# Patient Record
Sex: MALE | Race: WHITE | NOT HISPANIC OR LATINO | Employment: OTHER | URBAN - METROPOLITAN AREA
[De-identification: names, ages, dates, MRNs, and addresses within clinical notes are randomized per-mention and may not be internally consistent; named-entity substitution may affect disease eponyms.]

---

## 2020-01-17 ENCOUNTER — OFFICE VISIT (OUTPATIENT)
Dept: UROLOGY | Facility: CLINIC | Age: 45
End: 2020-01-17
Payer: COMMERCIAL

## 2020-01-17 VITALS
SYSTOLIC BLOOD PRESSURE: 128 MMHG | WEIGHT: 310 LBS | HEART RATE: 92 BPM | RESPIRATION RATE: 18 BRPM | HEIGHT: 72 IN | BODY MASS INDEX: 41.99 KG/M2 | DIASTOLIC BLOOD PRESSURE: 86 MMHG

## 2020-01-17 DIAGNOSIS — N20.0 STAGHORN CALCULUS: Primary | ICD-10-CM

## 2020-01-17 PROCEDURE — 99204 OFFICE O/P NEW MOD 45 MIN: CPT | Mod: S$GLB,,, | Performed by: SPECIALIST

## 2020-01-17 PROCEDURE — 99204 PR OFFICE/OUTPT VISIT, NEW, LEVL IV, 45-59 MIN: ICD-10-PCS | Mod: S$GLB,,, | Performed by: SPECIALIST

## 2020-01-17 RX ORDER — HYDROCODONE BITARTRATE AND ACETAMINOPHEN 10; 325 MG/1; MG/1
1 TABLET ORAL
COMMUNITY

## 2020-01-17 RX ORDER — VENLAFAXINE 100 MG/1
TABLET ORAL 2 TIMES DAILY
COMMUNITY

## 2020-01-17 RX ORDER — TRAZODONE HYDROCHLORIDE 50 MG/1
50 TABLET ORAL NIGHTLY
COMMUNITY

## 2020-01-17 RX ORDER — CYCLOBENZAPRINE HCL 10 MG
10 TABLET ORAL 3 TIMES DAILY PRN
COMMUNITY

## 2020-01-17 RX ORDER — OXYCODONE HCL 10 MG/1
10 TABLET, FILM COATED, EXTENDED RELEASE ORAL EVERY 12 HOURS PRN
COMMUNITY

## 2020-01-17 RX ORDER — AMOXICILLIN 500 MG
CAPSULE ORAL DAILY
COMMUNITY

## 2020-01-17 NOTE — PROGRESS NOTES
Subjective:       Patient ID: Donnell Avilez is a 44 y.o. male.    Chief Complaint: Nephrolithiasis (staghorn jaun. [facility])      HPI:  44-year-old man [facility] for staghorn calculus.  He reports a very prolonged history of nephrolithiasis been going on for years.  About 10-11 years ago he had treatment by a local urologist who performed retrograde ureteroscopic surgery for stones.    Since then he has been passing stones he reports that he has passed a couple over the years.  He has been dealing with persistent left flank pain is a dull ache that has a twisting component to it comes and goes.  On a scale 1-10 a worse is about a 6-7.  This pain is not radiating to any other parts of his body.  Mostly worsened by pressure.  [facility] was [facility] that confirmed he had a staghorn calculus.  He was referred to us for evaluation    Past Medical History:   Past Medical History:   Diagnosis Date    Diabetes mellitus     Herniated disc, cervical     Hyperlipidemia        Past Surgical Historical:   Past Surgical History:   Procedure Laterality Date    LITHOTRIPSY      UNDESCENDED TESTICLE EXPLORATION Left         Medications:   Medication List with Changes/Refills   Current Medications    CYCLOBENZAPRINE (FLEXERIL) 10 MG TABLET    Take 10 mg by mouth 3 (three) times daily as needed for Muscle spasms.    EMPAGLIFLOZIN (JARDIANCE) 25 MG TAB    Take 25 mg by mouth.    HYDROCODONE-ACETAMINOPHEN (NORCO)  MG PER TABLET    Take 1 tablet by mouth.    INSULIN GLARGINE,HUM.REC.ANLOG (LANTUS SUBQ)    Inject into the skin.    OMEGA-3 FATTY ACIDS/FISH OIL (FISH OIL-OMEGA-3 FATTY ACIDS) 300-1,000 MG CAPSULE    Take by mouth once daily.    OXYCODONE (OXYCONTIN) 10 MG 12 HR TABLET    Take 10 mg by mouth every 12 (twelve) hours as needed for Pain.    TRAZODONE (DESYREL) 50 MG TABLET    Take 50 mg by mouth every evening.    VENLAFAXINE  (EFFEXOR) 100 MG TAB    Take by mouth 2 (two) times daily.        Past Social History:   Social History     Socioeconomic History    Marital status:      Spouse name: Not on file    Number of children: Not on file    Years of education: Not on file    Highest education level: Not on file   Occupational History    Not on file   Social Needs    Financial resource strain: Not on file    Food insecurity:     Worry: Not on file     Inability: Not on file    Transportation needs:     Medical: Not on file     Non-medical: Not on file   Tobacco Use    Smoking status: Never Smoker    Smokeless tobacco: Never Used   Substance and Sexual Activity    Alcohol use: Never     Frequency: Never    Drug use: Never    Sexual activity: Not on file   Lifestyle    Physical activity:     Days per week: Not on file     Minutes per session: Not on file    Stress: Not on file   Relationships    Social connections:     Talks on phone: Not on file     Gets together: Not on file     Attends Hindu service: Not on file     Active member of club or organization: Not on file     Attends meetings of clubs or organizations: Not on file     Relationship status: Not on file   Other Topics Concern    Not on file   Social History Narrative    Not on file       Allergies: Review of patient's allergies indicates:  No Known Allergies     Family History:   Family History   Problem Relation Age of Onset    No Known Problems Father     Heart disease Mother         Review of Systems:  Review of Systems - General ROS: negative  Psychological ROS: negative  Ophthalmic ROS: negative  ENT ROS: negative  Allergy and Immunology ROS: negative  Hematological and Lymphatic ROS: negative  Endocrine ROS: negative  Respiratory ROS: no cough, shortness of breath, or wheezing  Cardiovascular ROS: no chest pain or dyspnea on exertion  Gastrointestinal ROS: no abdominal pain, change in bowel habits, or black or bloody stools  Genito-Urinary ROS:  positive for - left flank pain  Musculoskeletal ROS: negative  Neurological ROS: no TIA or stroke symptoms  Dermatological ROS: negative       Physical Exam:  General Appearance:    Alert, cooperative, no distress, appears stated age   Head:    Normocephalic, without obvious abnormality, atraumatic   Eyes:    PERRL, conjunctiva/corneas clear, EOM's intact, fundi     benign, both eyes        Ears:    Normal TM's and external ear canals, both ears   Nose:   Nares normal, septum midline, mucosa normal, no drainage    or sinus tenderness   Throat:   Lips, mucosa, and tongue normal; teeth and gums normal   Neck:   Supple, symmetrical, trachea midline, no adenopathy;        thyroid:  No enlargement/tenderness/nodules; no carotid    bruit or JVD   Back:     Symmetric, no curvature, ROM normal, no CVA tenderness   Lungs:     Clear to auscultation bilaterally, respirations unlabored   Chest wall:    No tenderness or deformity   Heart:    Regular rate and rhythm, S1 and S2 normal, no murmur, rub   or gallop   Abdomen:     Soft, non-tender, bowel sounds active all four quadrants,     no masses, no organomegaly   Genitalia:    Deferred   Rectal:    Deferred   Extremities:   Extremities normal, atraumatic, no cyanosis or edema   Pulses:   2+ and symmetric all extremities   Skin:   Skin color, texture, turgor normal, no rashes or lesions   Lymph nodes:   Cervical, supraclavicular, and axillary nodes normal   Neurologic:   CNII-XII intact. Normal strength, sensation and reflexes       throughout         Assessment/Plan:       44-year-old man with left flank pain found to have a staghorn calculus on KUB.    1.  Send him for CT scan abdomen and pelvis without IV contrast to characterize and determine the stone burden in the left kidney.  2.  Return to clinic in 2-3 weeks to make a plan about stone treatment    Problem List Items Addressed This Visit        Renal/    Staghorn calculus - Primary

## 2020-01-24 ENCOUNTER — OFFICE VISIT (OUTPATIENT)
Dept: UROLOGY | Facility: CLINIC | Age: 45
End: 2020-01-24
Payer: COMMERCIAL

## 2020-01-24 VITALS
WEIGHT: 310 LBS | RESPIRATION RATE: 19 BRPM | BODY MASS INDEX: 41.99 KG/M2 | HEART RATE: 98 BPM | HEIGHT: 72 IN | SYSTOLIC BLOOD PRESSURE: 139 MMHG | DIASTOLIC BLOOD PRESSURE: 88 MMHG

## 2020-01-24 DIAGNOSIS — N20.0 STAGHORN RENAL CALCULUS: Primary | ICD-10-CM

## 2020-01-24 PROCEDURE — 99213 OFFICE O/P EST LOW 20 MIN: CPT | Mod: S$GLB,,, | Performed by: SPECIALIST

## 2020-01-24 PROCEDURE — 99213 PR OFFICE/OUTPT VISIT, EST, LEVL III, 20-29 MIN: ICD-10-PCS | Mod: S$GLB,,, | Performed by: SPECIALIST

## 2020-01-24 NOTE — PROGRESS NOTES
Subjective:       Patient ID: Donnell Avilez is a 44 y.o. male.    Chief Complaint: Follow-up (2 wk f/u CT results)      HPI:  44-year-old  man with saw me about 2 weeks ago.  [facility] and [facility] KUB was obtained that showed a calcification overlying the left renal silhouette.  He was referred to me.  I set him up for a CT scan stone protocol here today to review the results.    We looked at the CT scan together he has almost a 3 cm stone in the left renal pelvis.  His predominant symptoms are pain in the left flank that prevents informed normal activities.  He just wants to stay be in bed all day.  He denies any blood in his urine denies any fevers or chills.    Past Medical History:   Past Medical History:   Diagnosis Date    Diabetes mellitus     Herniated disc, cervical     Hyperlipidemia        Past Surgical Historical:   Past Surgical History:   Procedure Laterality Date    LITHOTRIPSY      UNDESCENDED TESTICLE EXPLORATION Left         Medications:   Medication List with Changes/Refills   Current Medications    CYCLOBENZAPRINE (FLEXERIL) 10 MG TABLET    Take 10 mg by mouth 3 (three) times daily as needed for Muscle spasms.    EMPAGLIFLOZIN (JARDIANCE) 25 MG TAB    Take 25 mg by mouth.    HYDROCODONE-ACETAMINOPHEN (NORCO)  MG PER TABLET    Take 1 tablet by mouth.    INSULIN GLARGINE,HUM.REC.ANLOG (LANTUS SUBQ)    Inject into the skin.    OMEGA-3 FATTY ACIDS/FISH OIL (FISH OIL-OMEGA-3 FATTY ACIDS) 300-1,000 MG CAPSULE    Take by mouth once daily.    OXYCODONE (OXYCONTIN) 10 MG 12 HR TABLET    Take 10 mg by mouth every 12 (twelve) hours as needed for Pain.    TRAZODONE (DESYREL) 50 MG TABLET    Take 50 mg by mouth every evening.    VENLAFAXINE (EFFEXOR) 100 MG TAB    Take by mouth 2 (two) times daily.        Past Social History:   Social History     Socioeconomic History    Marital status:      Spouse name: Not on file    Number of children: Not on file    Years  of education: Not on file    Highest education level: Not on file   Occupational History    Not on file   Social Needs    Financial resource strain: Not on file    Food insecurity:     Worry: Not on file     Inability: Not on file    Transportation needs:     Medical: Not on file     Non-medical: Not on file   Tobacco Use    Smoking status: Never Smoker    Smokeless tobacco: Never Used   Substance and Sexual Activity    Alcohol use: Never     Frequency: Never    Drug use: Never    Sexual activity: Not on file   Lifestyle    Physical activity:     Days per week: Not on file     Minutes per session: Not on file    Stress: Not on file   Relationships    Social connections:     Talks on phone: Not on file     Gets together: Not on file     Attends Episcopalian service: Not on file     Active member of club or organization: Not on file     Attends meetings of clubs or organizations: Not on file     Relationship status: Not on file   Other Topics Concern    Not on file   Social History Narrative    Not on file       Allergies: Review of patient's allergies indicates:  No Known Allergies     Family History:   Family History   Problem Relation Age of Onset    No Known Problems Father     Heart disease Mother         Review of Systems:   systems reviewed and notable for left renal staghorn calculus  All other systems were reviewed Neg except as stated in the HPI    Physical Exam:  General: A&Ox3. No apparent distress. No deformities.  Neck: No masses. Normal thyroid.  Lungs: normal inspiration. No use of accessory muscles.  Heart: normal pulse. No arrhythmias.  Abdomen: Soft. NT. ND. No masses. No hernias. No hepatosplenomegaly.  Lymphatic: Neck and groin nodes negative.  Skin: The skin is warm and dry. No jaundice.  Neurology: Cranial nerves 2-12 crossly intact, no focal weaknesses, no sensation deficits, no motor deficits  Ext: No clubbing, cyanosis or edema.  :  Deferred      Assessment/Plan:        44-year-old man with a large left renal calculus will need therapy.    1.  We talked about treatment alternatives for the stone.  Such a large stone would require percutaneous nephrolithotomy.  But we also discussed other options including shockwave lithotripsy as well as retrograde ureteroscopic surgery.  Patient has elected to undergo a left PCNL.  We should send him to interventional Radiology for access and then treat the stone the following day.  2.  He has had general decision about 10 years I would like to get some kind of clearance from his primary care physician.  He is already working on that.    Problem List Items Addressed This Visit        Renal/    Staghorn renal calculus - Primary

## 2020-01-31 ENCOUNTER — CLINICAL SUPPORT (OUTPATIENT)
Dept: UROLOGY | Facility: CLINIC | Age: 45
End: 2020-01-31
Payer: COMMERCIAL

## 2020-01-31 DIAGNOSIS — N20.0 STAGHORN RENAL CALCULUS: Primary | ICD-10-CM

## 2020-02-04 ENCOUNTER — TELEPHONE (OUTPATIENT)
Dept: UROLOGY | Facility: CLINIC | Age: 45
End: 2020-02-04

## 2020-02-04 LAB
APTT PPP: 30.9 SEC (ref 25.1–36.5)
BASOPHILS NFR BLD: 0.1 10*3/UL (ref 0–0.3)
BASOPHILS NFR SNV MANUAL: 0.6 % (ref 0–3)
EOSINOPHIL NFR BLD: 0.1 10*3/UL (ref 0–0.6)
EOSINOPHIL NFR SNV MANUAL: 1.6 % (ref 0–6)
ERYTHROCYTE [DISTWIDTH] IN BLOOD BY AUTOMATED COUNT: 12.4 % (ref 0–15.5)
GLUCOSE SERPL-MCNC: 197 MG/DL (ref 70–105)
GLUCOSE SERPL-MCNC: 250 MG/DL (ref 70–105)
GLUCOSE SERPL-MCNC: 257 MG/DL (ref 70–105)
HCT VFR BLD AUTO: 52.3 % (ref 42–52)
HGB BLD-MCNC: 17.5 G/DL (ref 14–18)
IMMATURE GRANULOCYTES/100 LEUKOCYTES: 0.6 % (ref 0–0.43)
IMMATURE GRANULOCYTES: 0.05 THOU/UL (ref 0–0.03)
INR PPP: 1 INR (ref 0.9–1.1)
LYMPHOCYTES NFR BLD: 2.6 10*3/UL (ref 1.2–4)
LYMPHOCYTES NFR SNV MANUAL: 30.7 % (ref 20–45)
MANUAL NRBC PER 100 CELLS: 0 % (ref 0–0.2)
MCH RBC QN AUTO: 28.9 PG (ref 27–32)
MCHC RBC AUTO-ENTMCNC: 33.5 % (ref 32–36)
MCV RBC AUTO: 86.4 FL (ref 80–97)
MONOCYTES NFR BLD MANUAL: 0.6 10*3/UL (ref 0.1–0.8)
MONOCYTES/100 LEUKOCYTES: 7 % (ref 2–10)
NEUTROPHILS NFR BLD: 5 10*3/UL (ref 1.4–7)
NEUTROPHILS NFR SNV MANUAL: 59.5 % (ref 50–80)
NRBC: 0 THOU/UL (ref 0–0.01)
PLATELETS: 349 10*3/UL (ref 130–400)
PMV BLD AUTO: 10.7 FL (ref 9.2–12.2)
PROTHROMBIN TIME: 10.9 SEC (ref 10.2–12.9)
RBC # BLD AUTO: 6.05 10*6/UL (ref 4.7–6.1)
WBC # BLD: 8.4 10*3/UL (ref 4.5–10)

## 2020-02-04 NOTE — TELEPHONE ENCOUNTER
Spouse contact clinic regarding pt just getting to room from surgery and being in pain, she was requesting to have pain meds ordered stating that the nurse at the hospital told them that there was no order for pain meds. Staff nurse advised spouse that provider is not in clinic and he is at the hospital, also advised spouse that nurse on duty is to contact provider for an order. Spouse verbalized understanding and stated that the nurse was aware but she was calling thinking the clinic had to order the pain meds. Clinic nurse verbalized understanding. NB

## 2020-02-05 ENCOUNTER — OUTSIDE PLACE OF SERVICE (OUTPATIENT)
Dept: UROLOGY | Facility: CLINIC | Age: 45
End: 2020-02-05
Payer: COMMERCIAL

## 2020-02-05 LAB
GLUCOSE SERPL-MCNC: 278 MG/DL (ref 70–105)
GLUCOSE SERPL-MCNC: 278 MG/DL (ref 70–105)

## 2020-02-05 PROCEDURE — 50081 PERQ NL/PL LITHOTRP CPLX>2CM: CPT | Mod: LT,,, | Performed by: SPECIALIST

## 2020-02-05 PROCEDURE — 50435 PR EXCHANGE NEPHROSTOMY CATH, INCL GUID, S&I: ICD-10-PCS | Mod: 51,LT,, | Performed by: SPECIALIST

## 2020-02-05 PROCEDURE — 50435 EXCHANGE NEPHROSTOMY CATH: CPT | Mod: 51,LT,, | Performed by: SPECIALIST

## 2020-02-05 PROCEDURE — 50081 PR REMV KID STONE, 2+ CM, PERC: ICD-10-PCS | Mod: LT,,, | Performed by: SPECIALIST

## 2020-02-07 ENCOUNTER — TELEPHONE (OUTPATIENT)
Dept: UROLOGY | Facility: CLINIC | Age: 45
End: 2020-02-07

## 2020-02-07 NOTE — TELEPHONE ENCOUNTER
----- Message from Tracy Maciel sent at 2/7/2020  8:57 AM CST -----  Contact: pt  Type:  Patient Returning Call    Who Called: Donnell Avilez  Who Left Message for Patient: Nurse Pitts  Does the patient know what this is regarding?: Return a call  Would the patient rather a call back or a response via [employer]? call  Best Call Back Number: [phone]   Additional Information:

## 2020-02-10 ENCOUNTER — TELEPHONE (OUTPATIENT)
Dept: UROLOGY | Facility: CLINIC | Age: 45
End: 2020-02-10

## 2020-02-10 NOTE — TELEPHONE ENCOUNTER
Pt contacted clinic with questions about when to not cover wound or how long does he have to leave the bandage on the incision site and requested to change appt time. Nurse advised pt that Wed would be the day to leave the uncover the incision site and place a small amount of neosporin advised not to submerge in water but he is able to shower and dry area really good. Appt time changed to 3:40 pm. Pt verbalized understanding. NB

## 2020-02-21 ENCOUNTER — OFFICE VISIT (OUTPATIENT)
Dept: UROLOGY | Facility: CLINIC | Age: 45
End: 2020-02-21
Payer: COMMERCIAL

## 2020-02-21 VITALS
BODY MASS INDEX: 41.99 KG/M2 | HEIGHT: 72 IN | RESPIRATION RATE: 18 BRPM | WEIGHT: 310 LBS | DIASTOLIC BLOOD PRESSURE: 91 MMHG | HEART RATE: 101 BPM | SYSTOLIC BLOOD PRESSURE: 139 MMHG

## 2020-02-21 DIAGNOSIS — Z09 POSTOP CHECK: ICD-10-CM

## 2020-02-21 DIAGNOSIS — N20.0 RENAL CALCULUS, LEFT: Primary | ICD-10-CM

## 2020-02-21 PROCEDURE — 99024 POSTOP FOLLOW-UP VISIT: CPT | Mod: S$GLB,,, | Performed by: SPECIALIST

## 2020-02-21 PROCEDURE — 99024 PR POST-OP FOLLOW-UP VISIT: ICD-10-PCS | Mod: S$GLB,,, | Performed by: SPECIALIST

## 2020-02-21 RX ORDER — DIAZEPAM 5 MG/1
5 TABLET ORAL EVERY 8 HOURS PRN
Qty: 12 TABLET | Refills: 0 | Status: SHIPPED | OUTPATIENT
Start: 2020-02-21 | End: 2020-03-22

## 2020-02-21 RX ORDER — TAMSULOSIN HYDROCHLORIDE 0.4 MG/1
CAPSULE ORAL
COMMUNITY
Start: 2020-02-14

## 2020-02-21 RX ORDER — VENLAFAXINE HYDROCHLORIDE 75 MG/1
CAPSULE, EXTENDED RELEASE ORAL
COMMUNITY
Start: 2020-02-14

## 2020-02-21 RX ORDER — HYOSCYAMINE SULFATE 0.125 MG
250 TABLET ORAL EVERY 4 HOURS PRN
Qty: 30 TABLET | Refills: 1 | Status: SHIPPED | OUTPATIENT
Start: 2020-02-21 | End: 2020-03-22

## 2020-02-21 RX ORDER — INSULIN GLARGINE 100 [IU]/ML
INJECTION, SOLUTION SUBCUTANEOUS
COMMUNITY
Start: 2020-02-13

## 2020-02-21 NOTE — PROGRESS NOTES
Subjective:       Patient ID: Donnell Avilez is a 44 y.o. male.    Chief Complaint: Post-op Evaluation (c/o of discomfort from stent on left side)      HPI:  44-year-old man with a large left renal calculus is status post left PCNL.  His about 2 weeks out now he is here today for 1st postop check.    He has an indwelling stent and is reporting symptoms related to the stent including urinary urgency some frequencies.  He voids every 30 min.  He reports that his urine has just cleared up he does not see any blood in the urine any more.    He is back incision is also healing well    Past Medical History:   Past Medical History:   Diagnosis Date    Diabetes mellitus     Herniated disc, cervical     Hyperlipidemia        Past Surgical Historical:   Past Surgical History:   Procedure Laterality Date    LITHOTRIPSY      UNDESCENDED TESTICLE EXPLORATION Left         Medications:   Medication List with Changes/Refills   Current Medications    CYCLOBENZAPRINE (FLEXERIL) 10 MG TABLET    Take 10 mg by mouth 3 (three) times daily as needed for Muscle spasms.    EMPAGLIFLOZIN (JARDIANCE) 25 MG TAB    Take 25 mg by mouth.    HYDROCODONE-ACETAMINOPHEN (NORCO)  MG PER TABLET    Take 1 tablet by mouth.    INSULIN GLARGINE,HUM.REC.ANLOG (LANTUS SUBQ)    Inject into the skin.    LANTUS U-100 INSULIN 100 UNIT/ML INJECTION        OMEGA-3 FATTY ACIDS/FISH OIL (FISH OIL-OMEGA-3 FATTY ACIDS) 300-1,000 MG CAPSULE    Take by mouth once daily.    OXYCODONE (OXYCONTIN) 10 MG 12 HR TABLET    Take 10 mg by mouth every 12 (twelve) hours as needed for Pain.    TAMSULOSIN (FLOMAX) 0.4 MG CAP        TRAZODONE (DESYREL) 50 MG TABLET    Take 50 mg by mouth every evening.    VENLAFAXINE (EFFEXOR) 100 MG TAB    Take by mouth 2 (two) times daily.    VENLAFAXINE (EFFEXOR-XR) 75 MG 24 HR CAPSULE            Past Social History:   Social History     Socioeconomic History    Marital status:      Spouse name: Not on file    Number of  children: Not on file    Years of education: Not on file    Highest education level: Not on file   Occupational History    Not on file   Social Needs    Financial resource strain: Not on file    Food insecurity:     Worry: Not on file     Inability: Not on file    Transportation needs:     Medical: Not on file     Non-medical: Not on file   Tobacco Use    Smoking status: Never Smoker    Smokeless tobacco: Never Used   Substance and Sexual Activity    Alcohol use: Never     Frequency: Never    Drug use: Never    Sexual activity: Not on file   Lifestyle    Physical activity:     Days per week: Not on file     Minutes per session: Not on file    Stress: Not on file   Relationships    Social connections:     Talks on phone: Not on file     Gets together: Not on file     Attends Mu-ism service: Not on file     Active member of club or organization: Not on file     Attends meetings of clubs or organizations: Not on file     Relationship status: Not on file   Other Topics Concern    Not on file   Social History Narrative    Not on file       Allergies: Review of patient's allergies indicates:  No Known Allergies     Family History:   Family History   Problem Relation Age of Onset    No Known Problems Father     Heart disease Mother         Physical Exam:  Gen:  Alert and oriented x3; no acute distress  HEENT: NC/AT; PERRLA, Moist mucous membranes  Chest: CTAB  CVS: RR, Normal Rate, Normal cap refills  Abd: S/NT/ND, PCNL site has healed very well no erythema no swelling  :  Deferred      Assessment/Plan:       44-year-old man with a large left renal calculus status post PCNL    1.  I discussed the stone analysis with him today has calcium oxalate monohydrate 90% calcium oxalate dihydrate and 10%.  2.  Because of stent symptoms I will give him some Levsin as well as Valium  3.  Return to clinic in 2 weeks for stent removal    Problem List Items Addressed This Visit        Renal/    Renal calculus,  left - Primary      Other Visit Diagnoses     Postop check

## 2020-03-06 ENCOUNTER — PROCEDURE VISIT (OUTPATIENT)
Dept: UROLOGY | Facility: CLINIC | Age: 45
End: 2020-03-06
Payer: COMMERCIAL

## 2020-03-06 ENCOUNTER — HOSPITAL ENCOUNTER (OUTPATIENT)
Dept: RADIOLOGY | Facility: CLINIC | Age: 45
Discharge: HOME | End: 2020-03-06
Attending: SPECIALIST
Payer: COMMERCIAL

## 2020-03-06 VITALS
DIASTOLIC BLOOD PRESSURE: 93 MMHG | BODY MASS INDEX: 42.66 KG/M2 | RESPIRATION RATE: 18 BRPM | HEART RATE: 98 BPM | SYSTOLIC BLOOD PRESSURE: 126 MMHG | WEIGHT: 315 LBS | HEIGHT: 72 IN | OXYGEN SATURATION: 98 %

## 2020-03-06 DIAGNOSIS — N20.0 RENAL CALCULUS, LEFT: ICD-10-CM

## 2020-03-06 DIAGNOSIS — Z46.6 ENCOUNTER FOR REMOVAL OF URETERAL STENT: Primary | ICD-10-CM

## 2020-03-06 PROCEDURE — 74018 XR ABDOMEN AP 1 VIEW: ICD-10-PCS | Mod: 26,,, | Performed by: RADIOLOGY

## 2020-03-06 PROCEDURE — 74018 RADEX ABDOMEN 1 VIEW: CPT | Mod: 26,,, | Performed by: RADIOLOGY

## 2020-03-06 PROCEDURE — 52310 CYSTOSCOPY: ICD-10-PCS | Mod: 58,S$GLB,, | Performed by: SPECIALIST

## 2020-03-06 PROCEDURE — 74018 XR ABDOMEN AP 1 VIEW: ICD-10-PCS | Mod: TC,,, | Performed by: SPECIALIST

## 2020-03-06 PROCEDURE — 74018 RADEX ABDOMEN 1 VIEW: CPT | Mod: TC,,, | Performed by: SPECIALIST

## 2020-03-06 PROCEDURE — 52310 CYSTOSCOPY AND TREATMENT: CPT | Mod: 58,S$GLB,, | Performed by: SPECIALIST

## 2020-03-06 RX ORDER — CIPROFLOXACIN 500 MG/1
500 TABLET ORAL
Status: COMPLETED | OUTPATIENT
Start: 2020-03-06 | End: 2020-03-06

## 2020-03-06 RX ADMIN — CIPROFLOXACIN 500 MG: 500 TABLET ORAL at 12:03

## 2020-03-06 NOTE — PATIENT INSTRUCTIONS
Cystoscopy    Cystoscopy is a procedure that lets your doctor look directly inside your urethra and bladder. It can be used to:  · Help diagnose a problem with your urethra, bladder, or kidneys.  · Take a sample (biopsy) of bladder or urethral tissue.  · Treat certain problems (such as removing kidney stones).  · Place a stent to bypass an obstruction.  · Take special X-rays of the kidneys.  Based on the findings, your doctor may recommend other tests or treatments.  What is a cystoscope?  A cystoscope is a telescope-like instrument that contains lenses and fiberoptics (small glass wires that make bright light). The cystoscope may be straight and rigid, or flexible to bend around curves in the urethra. The doctor may look directly into the cystoscope, or project the image onto a monitor.  Getting ready  · Ask your doctor if you should stop taking any medicines before the procedure.  · Ask whether you should avoid eating or drinking anything after midnight before the procedure.  · Follow any other instructions your doctor gives you.  Tell your doctor before the exam if you:  · Take any medicines, such as aspirin or blood thinners  · Have allergies to any medicines  · Are pregnant   The procedure  Cystoscopy is done in the doctors office, surgery center, or hospital. The doctor and a nurse are present during the procedure. It takes only a few minutes, longer if a biopsy, X-ray, or treatment needs to be done.  During the procedure:  · You lie on an exam table on your back, knees bent and legs apart. You are covered with a drape.  · Your urethra and the area around it are washed. Anesthetic jelly may be applied to numb the urethra. Other pain medicine is usually not needed. In some cases, you may be offered a mild sedative to help you relax. If a more extensive procedure is to be done, such as a biopsy or kidney stone removal, general anesthesia may be needed.  · The cystoscope is inserted. A sterile fluid is put  into the bladder to expand it. You may feel pressure from this fluid.  · When the procedure is done, the cystoscope is removed.  After the procedure  If you had a sedative, general anesthesia, or spinal anesthesia, you must have someone drive you home. Once youre home:  · Drink plenty of fluids.  · You may have burning or light bleeding when you urinate--this is normal.  · Medicines may be prescribed to ease any discomfort or prevent infection. Take these as directed.  · Call your doctor if you have heavy bleeding or blood clots, burning that lasts more than a day, a fever over 100°F  (38° C), or trouble urinating.  Date Last Reviewed: 1/1/2017  © 9728-2437 [employer] [address] [place] [address], [place] [address]. All rights reserved. This information is not intended as a substitute for professional medical care. Always follow your healthcare professional's instructions.

## 2020-03-06 NOTE — PROCEDURES
"Cystoscopy  Date/Time: 3/6/2020 1:20 PM  Performed by: Sebas Curry MD  Authorized by: Sebas Curry MD     Consent Done?:  Yes (Written)  Time out: Immediately prior to procedure a "time out" was called to verify the correct patient, procedure, equipment, support staff and site/side marked as required.    Indications comment:  History of renal calculi  Position:  Supine  Anesthesia:  Intraurethral instillation  Preparation: Patient was prepped and draped in usual sterile fashion      Scope type:  Flexible cystoscope  Stent removed: Yes         Stent encrusted: Yes    External exam normal: Yes    Digital exam performed: No    Urethra normal: Yes    Prostate normal: Yes  Bladder neck normal: Bladder neck normal   Bladder normal: Yes      Patient tolerance:  Patient tolerated the procedure well with no immediate complications     Success with stent removal stent had encrustations on the bladder portion.    Plan:  He will return to clinic in 2 months to discuss stone analysis and other approaches for stone prevention.      "